# Patient Record
Sex: MALE | Race: WHITE | Employment: FULL TIME | URBAN - METROPOLITAN AREA
[De-identification: names, ages, dates, MRNs, and addresses within clinical notes are randomized per-mention and may not be internally consistent; named-entity substitution may affect disease eponyms.]

---

## 2019-03-18 ENCOUNTER — TELEPHONE (OUTPATIENT)
Dept: CARDIOLOGY CLINIC | Age: 34
End: 2019-03-18

## 2019-04-05 ENCOUNTER — OFFICE VISIT (OUTPATIENT)
Dept: NON INVASIVE DIAGNOSTICS | Age: 34
End: 2019-04-05
Payer: COMMERCIAL

## 2019-04-05 VITALS
HEIGHT: 72 IN | BODY MASS INDEX: 27.22 KG/M2 | DIASTOLIC BLOOD PRESSURE: 70 MMHG | RESPIRATION RATE: 16 BRPM | WEIGHT: 201 LBS | SYSTOLIC BLOOD PRESSURE: 102 MMHG | HEART RATE: 61 BPM

## 2019-04-05 DIAGNOSIS — R00.2 PALPITATIONS: ICD-10-CM

## 2019-04-05 DIAGNOSIS — R00.2 HEART PALPITATIONS: Primary | ICD-10-CM

## 2019-04-05 PROBLEM — E78.5 HLD (HYPERLIPIDEMIA): Status: ACTIVE | Noted: 2019-04-05

## 2019-04-05 PROCEDURE — 99203 OFFICE O/P NEW LOW 30 MIN: CPT | Performed by: NURSE PRACTITIONER

## 2019-04-05 PROCEDURE — 93000 ELECTROCARDIOGRAM COMPLETE: CPT | Performed by: NURSE PRACTITIONER

## 2019-04-05 ASSESSMENT — ENCOUNTER SYMPTOMS
SHORTNESS OF BREATH: 1
CHEST TIGHTNESS: 0

## 2019-04-05 NOTE — PROGRESS NOTES
Cardiac Electrophysiology Consultation Note    Magdalena Benito  1985  Date of Service: 4/5/2019  PCP: Rosanne Garcia MD  Electrophysiologist: Aminata Medellin DO    Reason for Consultation: palpitations, syncope    SUBJECTIVE: Magdalena Benito is a 35 yo male who I was asked to see in Cardiac Electrophysiology consultation for palpitations. His PMHx includes: palpitations, HLD. He played football and had an echocardiogram at age 16-14 for (?) murmur; this was normal. He continued to play sports wth no issues. His episodes palpitations and tachycardia have increased over the past few months and are intermittently associated with DEMPSEY. Ihe was in [place] in October with worsening symptoms at rest. He went to an Urgent Care. Labs, EKG and CXR were obtained and normal. He had a second episode of \"fluttering\" in November while waiting to board a plane. He has taken a new job which requires traveling and has not been watching his diet or exercising as he had. He also became a new father this past summer. He has started exercising and watching his diet due to elevated cholesterol. He has not worn a cardiac monitor or had a recent echocardiogram.  He denies angina, dyspnea, syncope, orthopnea and PND. There is no family history of sudden cardiac death or CAD that he is aware of. Social History     Tobacco Use    Smoking status: Never Smoker    Smokeless tobacco: Never Used   Substance Use Topics    Alcohol use: Yes     Comment: social       Current Outpatient Medications   Medication Sig Dispense Refill    GARLIC PO Take by mouth Indications: takes occasionally       No current facility-administered medications for this visit. No Known Allergies    Review of Systems   Respiratory: Positive for shortness of breath. Negative for chest tightness. Cardiovascular: Positive for palpitations. Negative for chest pain and leg swelling. Neurological: Negative for dizziness, syncope and light-headedness. All other systems reviewed and are negative. PHYSICAL EXAM:  Vitals:    04/05/19 1008   BP: 102/70   Pulse: 61   Resp: 16   Weight: 201 lb (91.2 kg)   Height: 6' (1.829 m)     Constitutional: Oriented to person, place, and time. Well-developed and cooperative. Head: Normocephalic and atraumatic. Eyes: Conjunctivae are normal.   Cardiovascular: S1 normal, S2 normal and intact distal pulses. Normal rate and rhythm. PMI is not displaced. Pulmonary/Chest: Effort normal and breath sounds normal. No respiratory distress. Abdominal: Soft. No tenderness. Musculoskeletal: Normal range of motion of all extremities, no muscle weakness. Neurological: Alert and oriented to person, place, and time. Gait normal.   Skin: Skin is warm and dry. No bruising, no ecchymosis and no rash noted. Extremity: No clubbing or cyanosis. No edema. Psychiatric: Normal mood and affect. Thought content normal.     Pertinent Cardiac Testing:     Echocardiogram: pending    Medi-lynx monitor applied     ECG 4/5/2019: Sinus rhythm, NAD, HR 61 bpm    I have independently reviewed all of the ECGs and rhythm strips per above. I have personally reviewed the laboratory, cardiac diagnostic and radiographic testing as outlined above. I have reviewed previous records noted in [address]. Impression:    1. Palpitations  - increasing over past few months, associated with DEMPSEY  - no syncope, lightheadedness, angina  - no H/O HTN, DM, CAD, family h/o SCD  - no previous testing  - no medications     2. HLD  - diet and exercise      Recommendations:    1. 2D echocardiogram to evaluate LV function and cardiac structure. 2. 30-day Medi-lynx cardiac monitor for arrhythmia evaluation. 3. No changes were made in his medications today. 4. Follow-up pending above test results. 5. He was asked to call the office with concerns prior to follow-up. Thank you for allowing me to participate in their care.        I have spent a total of 30 minutes with the patient  reviewing the above stated recommendations.  And a total of >50% of that time involved face-to-face time providing counseling and or coordination of care with the other providers    TOMMY [facility], [address] [place] [address] Physicians    CC: Dr Blanquita Chavez

## 2019-04-29 ENCOUNTER — HOSPITAL ENCOUNTER (OUTPATIENT)
Dept: CARDIOLOGY | Age: 34
Discharge: HOME | End: 2019-04-29
Payer: COMMERCIAL

## 2019-04-29 DIAGNOSIS — R00.2 PALPITATIONS: ICD-10-CM

## 2019-04-29 LAB
LV EF: 60 %
LVEF MODALITY: NORMAL

## 2019-04-29 PROCEDURE — 93306 TTE W/DOPPLER COMPLETE: CPT | Performed by: PSYCHIATRY & NEUROLOGY

## 2019-05-03 ENCOUNTER — TELEPHONE (OUTPATIENT)
Dept: NON INVASIVE DIAGNOSTICS | Age: 34
End: 2019-05-03

## 2019-05-03 NOTE — TELEPHONE ENCOUNTER
----- Message from TOMMY Romero sent at 5/3/2019 10:56 AM EDT -----  Amanda,    Please let him know his echo looks good. Normal heart function. Thanks,   KAITLIN  ----- Message -----  From: Cam Osorio DO  Sent: 4/30/2019   8:53 PM  To: TOMMY Romero    Please looks good.   ALK

## 2019-05-10 DIAGNOSIS — R00.2 PALPITATIONS: ICD-10-CM

## 2019-05-14 ENCOUNTER — TELEPHONE (OUTPATIENT)
Dept: NON INVASIVE DIAGNOSTICS | Age: 34
End: 2019-05-14

## 2019-05-14 NOTE — TELEPHONE ENCOUNTER
Amanda,    Discussed with Dr Bettye Quintanilla, from our perspective his testing is normal. If he is concerned about cardiac issues he should be referred to Cardiology.     ThanksKAITLIN

## 2019-05-14 NOTE — TELEPHONE ENCOUNTER
Left message for pt to call the office. Per Chula Lang monitor was normal. Discuss with pt how he is feeling, is he still having episodes that were discussed at [address]

## 2019-05-14 NOTE — TELEPHONE ENCOUNTER
----- Message from Anthony Peres DO sent at 5/11/2019  8:05 AM EDT -----  Cardiac Electrophysiology [facility] Report    Elan Payne  1985  Date of Service: 5/11/2019  Electrophysiology: FARSHAD Pena    1. Sinus rhythm/sinus tachycardia. 2. 1 episode of PSVT(short RP tachycardia) for 8 beats in duration. 3. No VT. 4. No AVB or sinus pauses. 5. Symptoms of \"fluttering\" occurred during [address] Anthony Peres DO  [facility]  [address] [facility]    ----- Message -----  From: Dea Ngo  Sent: 5/10/2019   4:23 PM  To:  Anthony Peres DO

## 2019-05-15 NOTE — TELEPHONE ENCOUNTER
Spoke with the patient and he said that he does not have a cardiologist. Put the referral in for cardiology he did not have a preference on which doctor he will be seeing. He verbalized understanding that he is fine from a EP perspective.

## 2019-06-04 ENCOUNTER — TELEPHONE (OUTPATIENT)
Dept: ADMINISTRATIVE | Age: 34
End: 2019-06-04

## 2019-06-04 NOTE — TELEPHONE ENCOUNTER
Pt was referred by Dr. Romulo Guajardo for SOB w/activity, CAD family hx . Pt was scheduled with Dr. Criss Garcia on 7/3/19 @ 7:45. Past cardiac hx form scanned.

## 2019-06-04 NOTE — TELEPHONE ENCOUNTER
New patient packet mailed. Notes from Dr. Jaylen Castillo in [address] [place] [address] Rd. Left message for patient to contact his PCP and have the office fax over recent notes and lab work.

## 2019-07-03 ENCOUNTER — OFFICE VISIT (OUTPATIENT)
Dept: CARDIOLOGY CLINIC | Age: 34
End: 2019-07-03
Payer: COMMERCIAL

## 2019-07-03 ENCOUNTER — HOSPITAL ENCOUNTER (OUTPATIENT)
Dept: CARDIOLOGY | Age: 34
Discharge: HOME | End: 2019-07-03
Payer: COMMERCIAL

## 2019-07-03 ENCOUNTER — TELEPHONE (OUTPATIENT)
Dept: CARDIOLOGY CLINIC | Age: 34
End: 2019-07-03

## 2019-07-03 VITALS
RESPIRATION RATE: 16 BRPM | HEART RATE: 60 BPM | SYSTOLIC BLOOD PRESSURE: 118 MMHG | BODY MASS INDEX: 26.4 KG/M2 | WEIGHT: 194.9 LBS | HEIGHT: 72 IN | DIASTOLIC BLOOD PRESSURE: 70 MMHG

## 2019-07-03 VITALS — HEART RATE: 100 BPM | DIASTOLIC BLOOD PRESSURE: 70 MMHG | SYSTOLIC BLOOD PRESSURE: 130 MMHG

## 2019-07-03 DIAGNOSIS — R07.9 CHEST PAIN, UNSPECIFIED TYPE: ICD-10-CM

## 2019-07-03 DIAGNOSIS — R00.2 PALPITATIONS: ICD-10-CM

## 2019-07-03 DIAGNOSIS — R07.9 CHEST PAIN, UNSPECIFIED TYPE: Primary | ICD-10-CM

## 2019-07-03 PROCEDURE — 99212 OFFICE O/P EST SF 10 MIN: CPT | Performed by: INTERNAL MEDICINE

## 2019-07-03 PROCEDURE — 93017 CV STRESS TEST TRACING ONLY: CPT

## 2019-07-03 NOTE — PROGRESS NOTES
Patient seen by dr. Cristine Cantu for palpitaitons  Full workup done and reviewed  Exam normal today  For ETT-R

## 2019-07-08 ENCOUNTER — TELEPHONE (OUTPATIENT)
Dept: CARDIOLOGY CLINIC | Age: 34
End: 2019-07-08

## 2020-03-17 ENCOUNTER — OFFICE VISIT (OUTPATIENT)
Dept: PRIMARY CARE CLINIC | Age: 35
End: 2020-03-17
Payer: COMMERCIAL

## 2020-03-17 VITALS
OXYGEN SATURATION: 99 % | DIASTOLIC BLOOD PRESSURE: 74 MMHG | RESPIRATION RATE: 16 BRPM | HEART RATE: 81 BPM | HEIGHT: 72 IN | BODY MASS INDEX: 26.41 KG/M2 | TEMPERATURE: 99.5 F | SYSTOLIC BLOOD PRESSURE: 112 MMHG | WEIGHT: 195 LBS

## 2020-03-17 LAB
INFLUENZA A ANTIGEN, POC: NORMAL
INFLUENZA B ANTIGEN, POC: POSITIVE

## 2020-03-17 PROCEDURE — G8419 CALC BMI OUT NRM PARAM NOF/U: HCPCS | Performed by: FAMILY MEDICINE

## 2020-03-17 PROCEDURE — G8427 DOCREV CUR MEDS BY ELIG CLIN: HCPCS | Performed by: FAMILY MEDICINE

## 2020-03-17 PROCEDURE — G8484 FLU IMMUNIZE NO ADMIN: HCPCS | Performed by: FAMILY MEDICINE

## 2020-03-17 PROCEDURE — 1036F TOBACCO NON-USER: CPT | Performed by: FAMILY MEDICINE

## 2020-03-17 PROCEDURE — 99213 OFFICE O/P EST LOW 20 MIN: CPT | Performed by: FAMILY MEDICINE

## 2020-03-17 PROCEDURE — 87804 INFLUENZA ASSAY W/OPTIC: CPT | Performed by: FAMILY MEDICINE

## 2020-03-17 RX ORDER — AMOXICILLIN 500 MG/1
CAPSULE ORAL
COMMUNITY
Start: 2020-03-16 | End: 2020-06-20

## 2020-03-17 RX ORDER — OSELTAMIVIR PHOSPHATE 75 MG/1
75 CAPSULE ORAL 2 TIMES DAILY
Qty: 10 CAPSULE | Refills: 0 | Status: SHIPPED | OUTPATIENT
Start: 2020-03-17 | End: 2020-03-22

## 2020-03-17 NOTE — PROGRESS NOTES
Teresita Pina  1985    Chief Complaint   Patient presents with    Fever    Headache    Cough    Chills       Respiratory Symptoms:  Patient complains of fever, headache, cough, chills day(s) of symptoms x3 days. Symptoms have been worsening with time. He denies wheezing/chest tightness. Relevant PMH: No pertinent PMH. Smoking history:  He  reports that he has never smoked. He has never used smokeless tobacco.     He has had no known ill contacts. Treatment to date: advil for fever. Travel screen completed:  Yes    BPA for COVID triggered: No    First thing, pressure in sinuses - took sudafed  Next day, very lethargic, chills and then cough  Upper chest cough, pressure   Mucous- phlegm started off brownish/tan and has now shifts to yellow/green  PCP diagnosed him with sinus infection and gave Amoxicillin yesterday   Fever spiked to 104 F last night - took a cold shower and advil and it dropped, previously temps were around 101   Cough, sore throat has been most annoying as well as amount of phlegm   No other antibiotics in the last 90 days   Lots of post nasal drip   Is now able to cough things up, at first this was hard, the mucous has become more liquid   NO shortness of breath, is able to walk up steps fine, but feels some tightness in his chest     Advil and Abx around 8:30/9:00     Has not tried muxinex    Wife just had baby. He has a 20 month at home and grandparents are helping (both of which have chronic illnesses including previous cancer)     Vitals:    03/17/20 1445   BP: 112/74   Pulse: 81   Resp: 16   Temp: 99.5 °F (37.5 °C)   SpO2: 99%     Physical Exam  Vitals signs reviewed. Constitutional:       General: He is not in acute distress. Appearance: Normal appearance. He is not ill-appearing, toxic-appearing or diaphoretic. HENT:      Head: Normocephalic and atraumatic. Right Ear: Ear canal normal. A middle ear effusion is present.  Tympanic membrane is not injected, scarred, perforated or erythematous. Left Ear: Ear canal normal. A middle ear effusion is present. Tympanic membrane is not injected, scarred, perforated or erythematous. Nose:      Right Sinus: No maxillary sinus tenderness or frontal sinus tenderness. Left Sinus: No maxillary sinus tenderness or frontal sinus tenderness. Mouth/Throat:      Mouth: Mucous membranes are moist.      Pharynx: Oropharynx is clear. Eyes:      General: No scleral icterus. Conjunctiva/sclera: Conjunctivae normal.   Cardiovascular:      Rate and Rhythm: Normal rate and regular rhythm. Heart sounds: No murmur. Pulmonary:      Effort: Pulmonary effort is normal. No respiratory distress. Breath sounds: Normal breath sounds. No stridor. No wheezing, rhonchi or rales. Skin:     General: Skin is warm. Neurological:      General: No focal deficit present. Mental Status: He is alert and oriented to person, place, and time. Psychiatric:         Mood and Affect: Mood normal.         Behavior: Behavior normal.        Assessment/Plan:  1. Influenza B  Precautions discussed with wife,  and grandparents at home  - oseltamivir (TAMIFLU) 76 MG capsule; Take 1 capsule by mouth 2 times daily for 5 days  Dispense: 10 capsule; Refill: 0  Tylenol for pain   Finish antibiotics for sinusitis   Symptomatic treatment     2. Fever, unspecified fever cause  - POCT Influenza A/B Antigen (BD Veritor): B positive     Estefanía Stack MD  3/17/20     This visit was provided as a focused evaluation during the COVID -19 pandemic/national emergency. A comprehensive review of all previous patient history and testing was not conducted. Pertinent findings were elicited during the visit.

## 2020-03-17 NOTE — PATIENT INSTRUCTIONS
The results and recommendations made for you today reflect your status at this time. Your condition is subject to change. Should your symptoms worsen, please seek additional medical attention. COVID- 19 Information    Take steps to protect yourself    Clean your hands often  [facility] your hands often with soap and water for at least 20 seconds especially after you have been in a public place, or after blowing your nose, coughing, or sneezing.  If soap and water are not readily available, use a hand  that contains at least 60% alcohol. Cover all surfaces of your hands and rub them together until they feel dry.  Avoid touching your eyes, nose, and mouth with unwashed hands. Avoid close contact   Avoid close contact with people who are sick    Put distance between yourself and other people if COVID-19 is spreading in your community. This is especially important for people who are at higher risk of getting very sick. Take steps to protect others    Stay home if youre sick   Stay home if you are sick, except to get medical care. Learn what to do if you are sick. Cover coughs and sneezes   Cover your mouth and nose with a tissue when you cough or sneeze or use the inside of your elbow.  Throw used tissues in the trash.  Immediately wash your hands with soap and water for at least 20 seconds. If soap and water are not readily available, clean your hands with a hand  that contains at least 60% alcohol. Wear a facemask if you are sick   If you are sick: You should wear a facemask when you are around other people (e.g., sharing a room or vehicle) and before you enter a healthcare providers office. If you are not able to wear a facemask (for example, because it causes trouble breathing), then you should do your best to cover your coughs and sneezes, and people who are caring for you should wear a facemask if they enter your room. Learn what to do if you are sick.    If you are NOT sick: You do not need to wear a facemask unless you are caring for someone who is sick (and they are not able to wear a facemask). Facemasks may be in short supply and they should be saved for caregivers. Clean and disinfect   Clean AND disinfect frequently touched surfaces daily. This includes tables, doorknobs, light switches, countertops, handles, desks, phones, keyboards, toilets, faucets, and sinks.  If surfaces are dirty, clean them: Use detergent or soap and water prior to disinfection. To disinfect:  Most common EPA-registered household disinfectants will work. Use disinfectants appropriate for the surface. Options include:    Diluting your household bleach. To make a bleach solution, mix:    5 tablespoons (1/3rd cup) bleach per gallon of water  OR    4 teaspoons bleach per quart of water  Follow s instructions for application and proper ventilation. Check to ensure the product is not past its expiration date. Never mix household bleach with ammonia or any other cleanser. Unexpired household bleach will be effective against coronaviruses when properly diluted.  Alcohol solutions. Ensure solution has at least 70% alcohol.  Other common EPA-registered household disinfectants. Products with EPA-approved emerging viral pathogens pdf icon[7 pages]external icon claims are expected to be effective against COVID-19 based on data for harder to kill viruses. Follow the s instructions for all cleaning and disinfection products (e.g., concentration, application method and contact time, etc.).

## 2020-03-28 ENCOUNTER — TELEPHONE (OUTPATIENT)
Dept: PRIMARY CARE CLINIC | Age: 35
End: 2020-03-28

## 2020-03-28 NOTE — TELEPHONE ENCOUNTER
Left message for patient to return call and sent visit/AVS to Dr. Tremayne Reeves office. Lamar Collado Follow Up Call    Flu Clinic Visit Date:  2020    Patient: Efrain Severs Patient : 1985     PCP:  Dr. Mei Olvera with: Patient    Interactive Patient Contact:  Was patient able to fill all prescriptions: yes  Is patient taking all medications as directed on the After Visit Summary? yes  Does patient understand their visit instructions: Yes  Does patient have questions or concerns that need addressed?: no  Follow-up testing completed?: N/A    Current patient status:   significantly improved    (Suspected COVID only) CDC self-care instructions reviewed:  N/A    Additional patient instructions: handwashing and social distancing.            Abraham Calix RN   3/28/20

## 2020-06-19 ENCOUNTER — TELEPHONE (OUTPATIENT)
Dept: CARDIOLOGY CLINIC | Age: 35
End: 2020-06-19

## 2020-06-20 ENCOUNTER — APPOINTMENT (OUTPATIENT)
Dept: GENERAL RADIOLOGY | Age: 35
End: 2020-06-20
Payer: COMMERCIAL

## 2020-06-20 ENCOUNTER — HOSPITAL ENCOUNTER (EMERGENCY)
Age: 35
Discharge: HOME | End: 2020-06-20
Attending: EMERGENCY MEDICINE
Payer: COMMERCIAL

## 2020-06-20 VITALS
HEIGHT: 72 IN | TEMPERATURE: 97.6 F | WEIGHT: 200 LBS | DIASTOLIC BLOOD PRESSURE: 82 MMHG | BODY MASS INDEX: 27.09 KG/M2 | OXYGEN SATURATION: 95 % | RESPIRATION RATE: 21 BRPM | SYSTOLIC BLOOD PRESSURE: 126 MMHG | HEART RATE: 63 BPM

## 2020-06-20 LAB
ANION GAP SERPL CALCULATED.3IONS-SCNC: 15 MMOL/L (ref 7–16)
BASOPHILS ABSOLUTE: 0.05 E9/L (ref 0–0.2)
BASOPHILS RELATIVE PERCENT: 0.6 % (ref 0–2)
BUN BLDV-MCNC: 13 MG/DL (ref 6–20)
CALCIUM SERPL-MCNC: 10 MG/DL (ref 8.6–10.2)
CHLORIDE BLD-SCNC: 99 MMOL/L (ref 98–107)
CO2: 25 MMOL/L (ref 22–29)
CREAT SERPL-MCNC: 0.9 MG/DL (ref 0.7–1.2)
D DIMER: <200 NG/ML DDU
EKG ATRIAL RATE: 61 BPM
EKG P AXIS: 37 DEGREES
EKG P-R INTERVAL: 124 MS
EKG Q-T INTERVAL: 406 MS
EKG QRS DURATION: 90 MS
EKG QTC CALCULATION (BAZETT): 408 MS
EKG R AXIS: 9 DEGREES
EKG T AXIS: 16 DEGREES
EKG VENTRICULAR RATE: 61 BPM
EOSINOPHILS ABSOLUTE: 0.4 E9/L (ref 0.05–0.5)
EOSINOPHILS RELATIVE PERCENT: 5.1 % (ref 0–6)
GFR AFRICAN AMERICAN: >60
GFR NON-AFRICAN AMERICAN: >60 ML/MIN/1.73
GLUCOSE BLD-MCNC: 110 MG/DL (ref 74–99)
HCT VFR BLD CALC: 43.2 % (ref 37–54)
HEMOGLOBIN: 14 G/DL (ref 12.5–16.5)
IMMATURE GRANULOCYTES #: 0.03 E9/L
IMMATURE GRANULOCYTES %: 0.4 % (ref 0–5)
LYMPHOCYTES ABSOLUTE: 1.65 E9/L (ref 1.5–4)
LYMPHOCYTES RELATIVE PERCENT: 21.2 % (ref 20–42)
MCH RBC QN AUTO: 28.6 PG (ref 26–35)
MCHC RBC AUTO-ENTMCNC: 32.4 % (ref 32–34.5)
MCV RBC AUTO: 88.3 FL (ref 80–99.9)
MONOCYTES ABSOLUTE: 0.54 E9/L (ref 0.1–0.95)
MONOCYTES RELATIVE PERCENT: 6.9 % (ref 2–12)
NEUTROPHILS ABSOLUTE: 5.12 E9/L (ref 1.8–7.3)
NEUTROPHILS RELATIVE PERCENT: 65.8 % (ref 43–80)
PDW BLD-RTO: 12.2 FL (ref 11.5–15)
PLATELET # BLD: 231 E9/L (ref 130–450)
PMV BLD AUTO: 10.7 FL (ref 7–12)
POTASSIUM REFLEX MAGNESIUM: 4.6 MMOL/L (ref 3.5–5)
PRO-BNP: 5 PG/ML (ref 0–125)
RBC # BLD: 4.89 E12/L (ref 3.8–5.8)
SODIUM BLD-SCNC: 139 MMOL/L (ref 132–146)
TROPONIN: <0.01 NG/ML (ref 0–0.03)
WBC # BLD: 7.8 E9/L (ref 4.5–11.5)

## 2020-06-20 PROCEDURE — 71045 X-RAY EXAM CHEST 1 VIEW: CPT

## 2020-06-20 PROCEDURE — 6360000002 HC RX W HCPCS: Performed by: EMERGENCY MEDICINE

## 2020-06-20 PROCEDURE — 83880 ASSAY OF NATRIURETIC PEPTIDE: CPT

## 2020-06-20 PROCEDURE — 85378 FIBRIN DEGRADE SEMIQUANT: CPT

## 2020-06-20 PROCEDURE — 93005 ELECTROCARDIOGRAM TRACING: CPT | Performed by: EMERGENCY MEDICINE

## 2020-06-20 PROCEDURE — 99285 EMERGENCY DEPT VISIT HI MDM: CPT

## 2020-06-20 PROCEDURE — 84484 ASSAY OF TROPONIN QUANT: CPT

## 2020-06-20 PROCEDURE — 85025 COMPLETE CBC W/AUTO DIFF WBC: CPT

## 2020-06-20 PROCEDURE — 93010 ELECTROCARDIOGRAM REPORT: CPT | Performed by: INTERNAL MEDICINE

## 2020-06-20 PROCEDURE — 80048 BASIC METABOLIC PNL TOTAL CA: CPT

## 2020-06-20 PROCEDURE — 96374 THER/PROPH/DIAG INJ IV PUSH: CPT

## 2020-06-20 RX ORDER — KETOROLAC TROMETHAMINE 30 MG/ML
15 INJECTION, SOLUTION INTRAMUSCULAR; INTRAVENOUS ONCE
Status: COMPLETED | OUTPATIENT
Start: 2020-06-20 | End: 2020-06-20

## 2020-06-20 RX ORDER — ASPIRIN 81 MG/1
81 TABLET ORAL DAILY
COMMUNITY

## 2020-06-20 RX ADMIN — KETOROLAC TROMETHAMINE 15 MG: 30 INJECTION, SOLUTION INTRAMUSCULAR at 06:07

## 2020-06-20 ASSESSMENT — PAIN DESCRIPTION - ORIENTATION: ORIENTATION: RIGHT;LEFT

## 2020-06-20 ASSESSMENT — PAIN DESCRIPTION - FREQUENCY: FREQUENCY: INTERMITTENT

## 2020-06-20 ASSESSMENT — ENCOUNTER SYMPTOMS
CHEST TIGHTNESS: 0
SHORTNESS OF BREATH: 0
NAUSEA: 0
BACK PAIN: 0
DIARRHEA: 0
VOMITING: 0
SINUS PAIN: 0
COUGH: 0
SORE THROAT: 0
ABDOMINAL PAIN: 0

## 2020-06-20 ASSESSMENT — PAIN DESCRIPTION - DESCRIPTORS: DESCRIPTORS: TIGHTNESS

## 2020-06-20 ASSESSMENT — PAIN SCALES - GENERAL
PAINLEVEL_OUTOF10: 5
PAINLEVEL_OUTOF10: 4

## 2020-06-20 ASSESSMENT — PAIN DESCRIPTION - LOCATION: LOCATION: CHEST

## 2020-06-20 ASSESSMENT — PAIN DESCRIPTION - PAIN TYPE: TYPE: ACUTE PAIN

## 2020-06-20 NOTE — ED PROVIDER NOTES
exudate. Eyes:      Conjunctiva/sclera: Conjunctivae normal.      Pupils: Pupils are equal, round, and reactive to light. Neck:      Musculoskeletal: Normal range of motion and neck supple. Cardiovascular:      Rate and Rhythm: Normal rate and regular rhythm. Pulses: Normal pulses. Heart sounds: Normal heart sounds. Pulmonary:      Effort: Pulmonary effort is normal. No respiratory distress. Breath sounds: Normal breath sounds. Chest:      Chest wall: No tenderness. Abdominal:      General: Bowel sounds are normal. There is no distension. Palpations: Abdomen is soft. Tenderness: There is no abdominal tenderness. Musculoskeletal: Normal range of motion. Skin:     General: Skin is warm and dry. Capillary Refill: Capillary refill takes less than 2 seconds. Neurological:      Mental Status: He is alert and oriented to person, place, and time. Cranial Nerves: No cranial nerve deficit. Sensory: No sensory deficit. Motor: No abnormal muscle tone. Psychiatric:         Behavior: Behavior normal.         Thought Content: Thought content normal.         Judgment: Judgment normal.          Procedures     MDM  Number of Diagnoses or Management Options  Chest pain, unspecified type:   Diagnosis management comments: This is a 63-year-old male presenting with chest pain and shortness of breath as well as palpitations for the past few days. He is nontoxic-appearing with stable vital signs. He has no reproducible pain on my exam.  Will obtain basic labs and imaging and reassess. Work-up unremarkable, patient feels better on reassessment. His EKG and troponin are negative. He is low risk by the heart score. Suspect some component of anxiety related to patient's chest pain and he does admit to some history of this. We will have him follow-up with PCP for reevaluation, he is welcome back to the department at any time if condition were to worsen.        Amount and/or Complexity of Data Reviewed  Clinical lab tests: ordered and reviewed  Tests in the radiology section of CPT®: ordered and reviewed               EKG:  This EKG is signed and interpreted by me. Rate: 61  Rhythm: Sinus  Interpretation: nsr, no ernestina or depression  Comparison: unchanged    The HEART Risk Score for Acute Coronary Syndrome  Age <46 years, 55-63, 65+  ?  0   > 2 Risk Factors for CAD?*, 1-2, 0  1   History: slight, moderate, highly suspicious  0   EKG: Normal, nonspecific repolarization changes, ST depression   0   Troponin; low, 1-3x normal  Limit, 3x+  0   Total HEART Score:  1     *Risk factors as follows:                  - History/Family history of CAD    - Hypertension      - Hyperlipidemia     - Diabetes mellitus  - Current smoker  - Obesity    Predicts 6-week risk of major adverse cardiac event. Low Score (0-3 points), risk of MACE of 0.9-1.7%. Moderate Score (4-6 points), risk of MACE of 12-16.6%. High Score (7-10 points), risk of MACE of 50-65%.      --------------------------------------------- PAST HISTORY ---------------------------------------------  Past Medical History:  has a past medical history of Heart palpitations and Hyperlipidemia. Past Surgical History:  has a past surgical history that includes Tympanoplasty. Social History:  reports that he has never smoked. He has never used smokeless tobacco. He reports current alcohol use. He reports previous drug use. Family History: family history includes High Cholesterol in his mother; Hypertension in his paternal grandfather and paternal grandmother; No Known Problems in his father and sister; Other in his maternal grandmother; Pacemaker in his paternal grandmother. The patients home medications have been reviewed. Allergies: Patient has no known allergies.     -------------------------------------------------- RESULTS -------------------------------------------------  Labs:  Results for orders placed or

## 2024-08-26 ENCOUNTER — HOSPITAL ENCOUNTER (OUTPATIENT)
Age: 39
Discharge: HOME | End: 2024-08-28
Payer: COMMERCIAL

## 2024-08-26 ENCOUNTER — HOSPITAL ENCOUNTER (OUTPATIENT)
Dept: GENERAL RADIOLOGY | Age: 39
Discharge: HOME | End: 2024-08-28
Payer: COMMERCIAL

## 2024-08-26 DIAGNOSIS — R52 PAIN: ICD-10-CM

## 2024-08-26 PROCEDURE — 74018 RADEX ABDOMEN 1 VIEW: CPT

## 2024-10-01 ENCOUNTER — HOSPITAL ENCOUNTER (EMERGENCY)
Age: 39
Discharge: HOME | End: 2024-10-01
Payer: COMMERCIAL

## 2024-10-01 ENCOUNTER — APPOINTMENT (OUTPATIENT)
Dept: CT IMAGING | Age: 39
End: 2024-10-01
Payer: COMMERCIAL

## 2024-10-01 VITALS
HEART RATE: 68 BPM | WEIGHT: 192 LBS | OXYGEN SATURATION: 97 % | DIASTOLIC BLOOD PRESSURE: 89 MMHG | TEMPERATURE: 97.7 F | BODY MASS INDEX: 26.01 KG/M2 | SYSTOLIC BLOOD PRESSURE: 139 MMHG | RESPIRATION RATE: 16 BRPM | HEIGHT: 72 IN

## 2024-10-01 DIAGNOSIS — S06.0X0A CONCUSSION WITHOUT LOSS OF CONSCIOUSNESS, INITIAL ENCOUNTER: ICD-10-CM

## 2024-10-01 DIAGNOSIS — S09.90XA CLOSED HEAD INJURY, INITIAL ENCOUNTER: Primary | ICD-10-CM

## 2024-10-01 PROCEDURE — 99284 EMERGENCY DEPT VISIT MOD MDM: CPT

## 2024-10-01 PROCEDURE — 70450 CT HEAD/BRAIN W/O DYE: CPT

## 2024-10-01 ASSESSMENT — LIFESTYLE VARIABLES
HOW OFTEN DO YOU HAVE A DRINK CONTAINING ALCOHOL: NEVER
HOW MANY STANDARD DRINKS CONTAINING ALCOHOL DO YOU HAVE ON A TYPICAL DAY: PATIENT DOES NOT DRINK

## 2024-10-01 NOTE — ED PROVIDER NOTES
Independent [facility] Visit.          [facility] EMERGENCY DEPARTMENT  ED  Encounter Note  Admit Date/RoomTime: 10/1/2024  7:17 PM  [place]  NAME: Dion Coates  : 1985  MRN: [id number]  PCP: Dell Lilly MD    CHIEF COMPLAINT     Head Injury (Daughter hit right side of head last night; headache, foggy, leg weakness; -loc; -thinners )    HISTORY OF PRESENT ILLNESS        Dion Coates is a 39 y.o. male who presents to the ED with complaints of dizziness and headache after head injury that occurred last night.  Patient states she was struck on the right side of the head and immediately felt pain.  Patient states today he has been foggy.  Patient states he has a general weakness all over.  Patient denies loss of consciousness.  Patient denies use of blood thinners.  Patient states he had a concussion in his childhood when he played football.  Patient states if he can remember correctly that is what this feels like.  Patient denies numbness or tingling to the lower extremities.  Patient denies nausea or vomiting.  REVIEW OF SYSTEMS     Pertinent positives and negatives are stated within HPI, all other systems reviewed and are negative.    Past Medical History:  has a past medical history of Heart palpitations and Hyperlipidemia.  Surgical History:  has a past surgical history that includes Tympanoplasty.  Social History:  reports that he has never smoked. He has never used smokeless tobacco. He reports current alcohol use. He reports that he does not currently use drugs.  Family History: family history includes High Cholesterol in his mother; Hypertension in his paternal grandfather and paternal grandmother; No Known Problems in his father and sister; Other in his maternal grandmother; Pacemaker in his paternal grandmother.   Allergies: Patient has no known allergies.  CURRENT MEDICATIONS       Discharge Medication List as of 10/1/2024  8:27 PM        CONTINUE